# Patient Record
Sex: FEMALE | Race: WHITE | NOT HISPANIC OR LATINO | URBAN - METROPOLITAN AREA
[De-identification: names, ages, dates, MRNs, and addresses within clinical notes are randomized per-mention and may not be internally consistent; named-entity substitution may affect disease eponyms.]

---

## 2024-01-22 ENCOUNTER — LAB (OUTPATIENT)
Dept: LAB | Facility: HOSPITAL | Age: 34
End: 2024-01-22
Payer: COMMERCIAL

## 2024-01-22 ENCOUNTER — TRANSCRIBE ORDERS (OUTPATIENT)
Dept: LAB | Facility: HOSPITAL | Age: 34
End: 2024-01-22
Payer: COMMERCIAL

## 2024-01-22 DIAGNOSIS — Z3A.12 12 WEEKS GESTATION OF PREGNANCY: Primary | ICD-10-CM

## 2024-01-22 DIAGNOSIS — Z3A.12 12 WEEKS GESTATION OF PREGNANCY: ICD-10-CM

## 2024-01-22 LAB
ABO GROUP BLD: NORMAL
AMPHET+METHAMPHET UR QL: NEGATIVE
AMPHETAMINES UR QL: NEGATIVE
BACTERIA UR QL AUTO: ABNORMAL /HPF
BARBITURATES UR QL SCN: NEGATIVE
BENZODIAZ UR QL SCN: NEGATIVE
BILIRUB UR QL STRIP: NEGATIVE
BLD GP AB SCN SERPL QL: NEGATIVE
BUPRENORPHINE SERPL-MCNC: NEGATIVE NG/ML
CANNABINOIDS SERPL QL: NEGATIVE
CLARITY UR: ABNORMAL
COCAINE UR QL: NEGATIVE
COLOR UR: YELLOW
DEPRECATED RDW RBC AUTO: 40.9 FL (ref 37–54)
ERYTHROCYTE [DISTWIDTH] IN BLOOD BY AUTOMATED COUNT: 12.5 % (ref 12.3–15.4)
FENTANYL UR-MCNC: NEGATIVE NG/ML
GLUCOSE UR STRIP-MCNC: NEGATIVE MG/DL
HBV SURFACE AG SERPL QL IA: NORMAL
HCT VFR BLD AUTO: 38.6 % (ref 34–46.6)
HCV AB SER DONR QL: NORMAL
HGB BLD-MCNC: 12.6 G/DL (ref 12–15.9)
HGB UR QL STRIP.AUTO: NEGATIVE
HIV 1+2 AB+HIV1 P24 AG SERPL QL IA: NORMAL
HOLD SPECIMEN: NORMAL
HYALINE CASTS UR QL AUTO: ABNORMAL /LPF
KETONES UR QL STRIP: NEGATIVE
LEUKOCYTE ESTERASE UR QL STRIP.AUTO: ABNORMAL
MCH RBC QN AUTO: 29.4 PG (ref 26.6–33)
MCHC RBC AUTO-ENTMCNC: 32.6 G/DL (ref 31.5–35.7)
MCV RBC AUTO: 90 FL (ref 79–97)
METHADONE UR QL SCN: NEGATIVE
NITRITE UR QL STRIP: NEGATIVE
OPIATES UR QL: NEGATIVE
OXYCODONE UR QL SCN: NEGATIVE
PCP UR QL SCN: NEGATIVE
PH UR STRIP.AUTO: 8 [PH] (ref 5–8)
PLATELET # BLD AUTO: 314 10*3/MM3 (ref 140–450)
PMV BLD AUTO: 10.8 FL (ref 6–12)
PROT UR QL STRIP: ABNORMAL
RBC # BLD AUTO: 4.29 10*6/MM3 (ref 3.77–5.28)
RBC # UR STRIP: ABNORMAL /HPF
REF LAB TEST METHOD: ABNORMAL
RH BLD: POSITIVE
SP GR UR STRIP: 1.02 (ref 1–1.03)
SQUAMOUS #/AREA URNS HPF: ABNORMAL /HPF
TRICYCLICS UR QL SCN: NEGATIVE
UROBILINOGEN UR QL STRIP: ABNORMAL
WBC # UR STRIP: ABNORMAL /HPF
WBC NRBC COR # BLD AUTO: 7.77 10*3/MM3 (ref 3.4–10.8)

## 2024-01-22 PROCEDURE — 86850 RBC ANTIBODY SCREEN: CPT

## 2024-01-22 PROCEDURE — 86900 BLOOD TYPING SEROLOGIC ABO: CPT

## 2024-01-22 PROCEDURE — 86762 RUBELLA ANTIBODY: CPT

## 2024-01-22 PROCEDURE — 85027 COMPLETE CBC AUTOMATED: CPT

## 2024-01-22 PROCEDURE — 86803 HEPATITIS C AB TEST: CPT

## 2024-01-22 PROCEDURE — G0432 EIA HIV-1/HIV-2 SCREEN: HCPCS

## 2024-01-22 PROCEDURE — 87086 URINE CULTURE/COLONY COUNT: CPT

## 2024-01-22 PROCEDURE — 86780 TREPONEMA PALLIDUM: CPT

## 2024-01-22 PROCEDURE — 86901 BLOOD TYPING SEROLOGIC RH(D): CPT

## 2024-01-22 PROCEDURE — 81001 URINALYSIS AUTO W/SCOPE: CPT

## 2024-01-22 PROCEDURE — 87491 CHLMYD TRACH DNA AMP PROBE: CPT

## 2024-01-22 PROCEDURE — 80307 DRUG TEST PRSMV CHEM ANLYZR: CPT

## 2024-01-22 PROCEDURE — 87591 N.GONORRHOEAE DNA AMP PROB: CPT

## 2024-01-22 PROCEDURE — 87340 HEPATITIS B SURFACE AG IA: CPT

## 2024-01-22 PROCEDURE — 36415 COLL VENOUS BLD VENIPUNCTURE: CPT

## 2024-01-23 LAB
RUBV IGG SERPL IA-ACNC: 1.53 INDEX
TREPONEMA PALLIDUM IGG+IGM AB [PRESENCE] IN SERUM OR PLASMA BY IMMUNOASSAY: NON REACTIVE

## 2024-01-24 LAB
C TRACH RRNA SPEC QL NAA+PROBE: NEGATIVE
N GONORRHOEA RRNA SPEC QL NAA+PROBE: NEGATIVE

## 2024-01-29 LAB
BACTERIA SPEC AEROBE CULT: NORMAL
BACTERIA SPEC AEROBE CULT: NORMAL

## 2024-04-12 ENCOUNTER — LAB (OUTPATIENT)
Dept: LAB | Facility: HOSPITAL | Age: 34
End: 2024-04-12
Payer: COMMERCIAL

## 2024-04-12 ENCOUNTER — TRANSCRIBE ORDERS (OUTPATIENT)
Dept: LAB | Facility: HOSPITAL | Age: 34
End: 2024-04-12
Payer: COMMERCIAL

## 2024-04-12 DIAGNOSIS — Z3A.20 20 WEEKS GESTATION OF PREGNANCY: Primary | ICD-10-CM

## 2024-04-12 DIAGNOSIS — Z3A.20 20 WEEKS GESTATION OF PREGNANCY: ICD-10-CM

## 2024-04-12 LAB — GLUCOSE 1H P 100 G GLC PO SERPL-MCNC: 123 MG/DL (ref 65–139)

## 2024-04-12 PROCEDURE — 82950 GLUCOSE TEST: CPT

## 2024-04-12 PROCEDURE — 36415 COLL VENOUS BLD VENIPUNCTURE: CPT

## 2024-04-12 PROCEDURE — 82948 REAGENT STRIP/BLOOD GLUCOSE: CPT | Performed by: OBSTETRICS & GYNECOLOGY

## 2024-06-07 ENCOUNTER — TRANSCRIBE ORDERS (OUTPATIENT)
Dept: LAB | Facility: HOSPITAL | Age: 34
End: 2024-06-07
Payer: COMMERCIAL

## 2024-06-07 ENCOUNTER — LAB (OUTPATIENT)
Dept: LAB | Facility: HOSPITAL | Age: 34
End: 2024-06-07
Payer: COMMERCIAL

## 2024-06-07 DIAGNOSIS — Z34.83 PRENATAL CARE, SUBSEQUENT PREGNANCY, THIRD TRIMESTER: ICD-10-CM

## 2024-06-07 DIAGNOSIS — Z34.83 PRENATAL CARE, SUBSEQUENT PREGNANCY, THIRD TRIMESTER: Primary | ICD-10-CM

## 2024-06-07 LAB
DEPRECATED RDW RBC AUTO: 44.3 FL (ref 37–54)
ERYTHROCYTE [DISTWIDTH] IN BLOOD BY AUTOMATED COUNT: 13.1 % (ref 12.3–15.4)
HCT VFR BLD AUTO: 30.4 % (ref 34–46.6)
HGB BLD-MCNC: 10.2 G/DL (ref 12–15.9)
MCH RBC QN AUTO: 31.2 PG (ref 26.6–33)
MCHC RBC AUTO-ENTMCNC: 33.6 G/DL (ref 31.5–35.7)
MCV RBC AUTO: 93 FL (ref 79–97)
PLATELET # BLD AUTO: 277 10*3/MM3 (ref 140–450)
PMV BLD AUTO: 11.2 FL (ref 6–12)
RBC # BLD AUTO: 3.27 10*6/MM3 (ref 3.77–5.28)
WBC NRBC COR # BLD AUTO: 10.1 10*3/MM3 (ref 3.4–10.8)

## 2024-06-07 PROCEDURE — 85027 COMPLETE CBC AUTOMATED: CPT

## 2024-06-07 PROCEDURE — 36415 COLL VENOUS BLD VENIPUNCTURE: CPT

## 2024-07-19 ENCOUNTER — TRANSCRIBE ORDERS (OUTPATIENT)
Dept: LAB | Facility: HOSPITAL | Age: 34
End: 2024-07-19
Payer: COMMERCIAL

## 2024-07-19 ENCOUNTER — LAB (OUTPATIENT)
Dept: LAB | Facility: HOSPITAL | Age: 34
End: 2024-07-19
Payer: COMMERCIAL

## 2024-07-19 DIAGNOSIS — Z34.83 PRENATAL CARE, SUBSEQUENT PREGNANCY, THIRD TRIMESTER: Primary | ICD-10-CM

## 2024-07-19 DIAGNOSIS — Z34.83 PRENATAL CARE, SUBSEQUENT PREGNANCY, THIRD TRIMESTER: ICD-10-CM

## 2024-07-19 LAB
ALBUMIN SERPL-MCNC: 3.6 G/DL (ref 3.5–5.2)
ALBUMIN/GLOB SERPL: 1.1 G/DL
ALP SERPL-CCNC: 300 U/L (ref 39–117)
ALT SERPL W P-5'-P-CCNC: 149 U/L (ref 1–33)
ANION GAP SERPL CALCULATED.3IONS-SCNC: 12 MMOL/L (ref 5–15)
AST SERPL-CCNC: 125 U/L (ref 1–32)
BILE AC SERPL-SCNC: 15 UMOL/L (ref 0–10)
BILIRUB SERPL-MCNC: 0.4 MG/DL (ref 0–1.2)
BUN SERPL-MCNC: 7 MG/DL (ref 6–20)
BUN/CREAT SERPL: 10.9 (ref 7–25)
CALCIUM SPEC-SCNC: 9.5 MG/DL (ref 8.6–10.5)
CHLORIDE SERPL-SCNC: 101 MMOL/L (ref 98–107)
CO2 SERPL-SCNC: 23 MMOL/L (ref 22–29)
CREAT SERPL-MCNC: 0.64 MG/DL (ref 0.57–1)
EGFRCR SERPLBLD CKD-EPI 2021: 121.3 ML/MIN/1.73
GLOBULIN UR ELPH-MCNC: 3.3 GM/DL
GLUCOSE SERPL-MCNC: 91 MG/DL (ref 65–99)
POTASSIUM SERPL-SCNC: 4.7 MMOL/L (ref 3.5–5.2)
PROT SERPL-MCNC: 6.9 G/DL (ref 6–8.5)
SODIUM SERPL-SCNC: 136 MMOL/L (ref 136–145)

## 2024-07-19 PROCEDURE — 82239 BILE ACIDS TOTAL: CPT

## 2024-07-19 PROCEDURE — 80053 COMPREHEN METABOLIC PANEL: CPT

## 2024-07-19 PROCEDURE — 36415 COLL VENOUS BLD VENIPUNCTURE: CPT

## 2024-07-23 ENCOUNTER — HOSPITAL ENCOUNTER (OUTPATIENT)
Dept: LABOR AND DELIVERY | Facility: HOSPITAL | Age: 34
Discharge: HOME | End: 2024-07-23
Payer: COMMERCIAL

## 2024-07-23 ENCOUNTER — HOSPITAL ENCOUNTER (INPATIENT)
Facility: HOSPITAL | Age: 34
LOS: 2 days | Discharge: HOME | End: 2024-07-25
Attending: OBSTETRICS & GYNECOLOGY | Admitting: OBSTETRICS & GYNECOLOGY
Payer: COMMERCIAL

## 2024-07-23 PROBLEM — O26.643 CHOLESTASIS OF PREGNANCY IN THIRD TRIMESTER: Status: ACTIVE | Noted: 2024-07-23

## 2024-07-23 PROBLEM — O99.019 MATERNAL ANEMIA IN PREGNANCY, ANTEPARTUM: Status: ACTIVE | Noted: 2024-07-23

## 2024-07-23 PROBLEM — Z34.90 ENCOUNTER FOR INDUCTION OF LABOR: Status: ACTIVE | Noted: 2024-07-23

## 2024-07-23 LAB
ABO GROUP BLD: NORMAL
BLD GP AB SCN SERPL QL: NEGATIVE
DEPRECATED RDW RBC AUTO: 42.1 FL (ref 37–54)
ERYTHROCYTE [DISTWIDTH] IN BLOOD BY AUTOMATED COUNT: 12.7 % (ref 12.3–15.4)
EXTERNAL GROUP B STREP ANTIGEN: POSITIVE
HCT VFR BLD AUTO: 33.4 % (ref 34–46.6)
HGB BLD-MCNC: 11.4 G/DL (ref 12–15.9)
MCH RBC QN AUTO: 31.1 PG (ref 26.6–33)
MCHC RBC AUTO-ENTMCNC: 34.1 G/DL (ref 31.5–35.7)
MCV RBC AUTO: 91.3 FL (ref 79–97)
PLATELET # BLD AUTO: 265 10*3/MM3 (ref 140–450)
PMV BLD AUTO: 11.9 FL (ref 6–12)
RBC # BLD AUTO: 3.66 10*6/MM3 (ref 3.77–5.28)
RH BLD: POSITIVE
T&S EXPIRATION DATE: NORMAL
WBC NRBC COR # BLD AUTO: 9.85 10*3/MM3 (ref 3.4–10.8)

## 2024-07-23 PROCEDURE — 3E033VJ INTRODUCTION OF OTHER HORMONE INTO PERIPHERAL VEIN, PERCUTANEOUS APPROACH: ICD-10-PCS | Performed by: STUDENT IN AN ORGANIZED HEALTH CARE EDUCATION/TRAINING PROGRAM

## 2024-07-23 PROCEDURE — 86900 BLOOD TYPING SEROLOGIC ABO: CPT | Performed by: ADVANCED PRACTICE MIDWIFE

## 2024-07-23 PROCEDURE — 86850 RBC ANTIBODY SCREEN: CPT | Performed by: ADVANCED PRACTICE MIDWIFE

## 2024-07-23 PROCEDURE — 25010000002 PENICILLIN G POTASSIUM PER 600000 UNITS: Performed by: ADVANCED PRACTICE MIDWIFE

## 2024-07-23 PROCEDURE — 86780 TREPONEMA PALLIDUM: CPT | Performed by: ADVANCED PRACTICE MIDWIFE

## 2024-07-23 PROCEDURE — 25010000002 OXYTOCIN PER 10 UNITS: Performed by: ADVANCED PRACTICE MIDWIFE

## 2024-07-23 PROCEDURE — 86901 BLOOD TYPING SEROLOGIC RH(D): CPT | Performed by: ADVANCED PRACTICE MIDWIFE

## 2024-07-23 PROCEDURE — 59025 FETAL NON-STRESS TEST: CPT

## 2024-07-23 PROCEDURE — 25810000003 LACTATED RINGERS PER 1000 ML: Performed by: ADVANCED PRACTICE MIDWIFE

## 2024-07-23 PROCEDURE — 85027 COMPLETE CBC AUTOMATED: CPT | Performed by: ADVANCED PRACTICE MIDWIFE

## 2024-07-23 PROCEDURE — 36415 COLL VENOUS BLD VENIPUNCTURE: CPT | Performed by: ADVANCED PRACTICE MIDWIFE

## 2024-07-23 PROCEDURE — 25810000003 SODIUM CHLORIDE 0.9 % SOLUTION: Performed by: ADVANCED PRACTICE MIDWIFE

## 2024-07-23 RX ORDER — SODIUM CHLORIDE, SODIUM LACTATE, POTASSIUM CHLORIDE, CALCIUM CHLORIDE 600; 310; 30; 20 MG/100ML; MG/100ML; MG/100ML; MG/100ML
125 INJECTION, SOLUTION INTRAVENOUS CONTINUOUS
Status: DISCONTINUED | OUTPATIENT
Start: 2024-07-23 | End: 2024-07-25 | Stop reason: HOSPADM

## 2024-07-23 RX ORDER — SODIUM CHLORIDE 0.9 % (FLUSH) 0.9 %
10 SYRINGE (ML) INJECTION AS NEEDED
Status: DISCONTINUED | OUTPATIENT
Start: 2024-07-23 | End: 2024-07-24 | Stop reason: HOSPADM

## 2024-07-23 RX ORDER — OXYTOCIN/0.9 % SODIUM CHLORIDE 30/500 ML
2 PLASTIC BAG, INJECTION (ML) INTRAVENOUS
Status: DISCONTINUED | OUTPATIENT
Start: 2024-07-23 | End: 2024-07-25 | Stop reason: HOSPADM

## 2024-07-23 RX ORDER — PENICILLIN G 3000000 [IU]/50ML
3 INJECTION, SOLUTION INTRAVENOUS EVERY 4 HOURS
Qty: 200 ML | Refills: 0 | Status: DISCONTINUED | OUTPATIENT
Start: 2024-07-24 | End: 2024-07-24 | Stop reason: HOSPADM

## 2024-07-23 RX ORDER — SODIUM CHLORIDE 9 MG/ML
40 INJECTION, SOLUTION INTRAVENOUS AS NEEDED
Status: DISCONTINUED | OUTPATIENT
Start: 2024-07-23 | End: 2024-07-24 | Stop reason: HOSPADM

## 2024-07-23 RX ORDER — ONDANSETRON 4 MG/1
4 TABLET, ORALLY DISINTEGRATING ORAL EVERY 6 HOURS PRN
Status: DISCONTINUED | OUTPATIENT
Start: 2024-07-23 | End: 2024-07-24

## 2024-07-23 RX ORDER — LIDOCAINE HYDROCHLORIDE 10 MG/ML
0.5 INJECTION, SOLUTION EPIDURAL; INFILTRATION; INTRACAUDAL; PERINEURAL ONCE AS NEEDED
Status: DISCONTINUED | OUTPATIENT
Start: 2024-07-23 | End: 2024-07-24 | Stop reason: HOSPADM

## 2024-07-23 RX ORDER — MAGNESIUM CARB/ALUMINUM HYDROX 105-160MG
30 TABLET,CHEWABLE ORAL ONCE
Status: COMPLETED | OUTPATIENT
Start: 2024-07-23 | End: 2024-07-23

## 2024-07-23 RX ORDER — MORPHINE SULFATE 2 MG/ML
2 INJECTION, SOLUTION INTRAMUSCULAR; INTRAVENOUS EVERY 4 HOURS PRN
Status: DISCONTINUED | OUTPATIENT
Start: 2024-07-23 | End: 2024-07-24 | Stop reason: HOSPADM

## 2024-07-23 RX ORDER — ONDANSETRON 2 MG/ML
4 INJECTION INTRAMUSCULAR; INTRAVENOUS EVERY 6 HOURS PRN
Status: DISCONTINUED | OUTPATIENT
Start: 2024-07-23 | End: 2024-07-24

## 2024-07-23 RX ORDER — ACETAMINOPHEN 325 MG/1
650 TABLET ORAL EVERY 4 HOURS PRN
Status: DISCONTINUED | OUTPATIENT
Start: 2024-07-23 | End: 2024-07-24 | Stop reason: SDUPTHER

## 2024-07-23 RX ORDER — NALOXONE HCL 0.4 MG/ML
0.4 VIAL (ML) INJECTION
Status: DISCONTINUED | OUTPATIENT
Start: 2024-07-23 | End: 2024-07-24 | Stop reason: HOSPADM

## 2024-07-23 RX ORDER — MORPHINE SULFATE 2 MG/ML
2 INJECTION, SOLUTION INTRAMUSCULAR; INTRAVENOUS
Status: DISCONTINUED | OUTPATIENT
Start: 2024-07-23 | End: 2024-07-24 | Stop reason: HOSPADM

## 2024-07-23 RX ORDER — PRENATAL WITH FERROUS FUM AND FOLIC ACID 3080; 920; 120; 400; 22; 1.84; 3; 20; 10; 1; 12; 200; 27; 25; 2 [IU]/1; [IU]/1; MG/1; [IU]/1; MG/1; MG/1; MG/1; MG/1; MG/1; MG/1; UG/1; MG/1; MG/1; MG/1; MG/1
TABLET ORAL DAILY
COMMUNITY

## 2024-07-23 RX ORDER — TERBUTALINE SULFATE 1 MG/ML
0.25 INJECTION, SOLUTION SUBCUTANEOUS AS NEEDED
Status: DISCONTINUED | OUTPATIENT
Start: 2024-07-23 | End: 2024-07-24 | Stop reason: HOSPADM

## 2024-07-23 RX ORDER — SODIUM CHLORIDE 0.9 % (FLUSH) 0.9 %
10 SYRINGE (ML) INJECTION EVERY 12 HOURS SCHEDULED
Status: DISCONTINUED | OUTPATIENT
Start: 2024-07-23 | End: 2024-07-24 | Stop reason: HOSPADM

## 2024-07-23 RX ADMIN — MINERAL OIL 30 ML: 1000 SOLUTION ORAL at 20:31

## 2024-07-23 RX ADMIN — OXYTOCIN 2 MILLI-UNITS/MIN: 10 INJECTION INTRAVENOUS at 21:02

## 2024-07-23 RX ADMIN — SODIUM CHLORIDE 5 MILLION UNITS: 900 INJECTION INTRAVENOUS at 20:31

## 2024-07-23 RX ADMIN — SODIUM CHLORIDE, POTASSIUM CHLORIDE, SODIUM LACTATE AND CALCIUM CHLORIDE 125 ML/HR: 600; 310; 30; 20 INJECTION, SOLUTION INTRAVENOUS at 20:34

## 2024-07-23 NOTE — H&P
[facility]  Obstetric History and Physical    Chief Complaint   Patient presents with    Scheduled Induction       Subjective     Patient is a 33 y.o. female  currently at Unknown, who presents for induction of labor  for cholestasis  with favorable cervix. On intake reports contractions, denies  leakage of fluid, denies  vaginal bleeding. reports fetal movement.    Her prenatal care is complicated by  GBS + and cholestasis. Her previous obstetric/gynecological history is noted for is remarkable for  x 2 without anesthesia    The following portions of the patients history were reviewed and updated as appropriate: past medical history, past surgical history, past family history, and past social history .       Prenatal Information:  Prenatal Results    The patient does not have a working SUYAPA. Some results will not display without a working SUYAPA.   Initial Prenatal Labs       Test Value Reference Range Date Time    Hemoglobin        Hematocrit        Platelets        Rubella IgG        Hepatitis B SAg        Hepatitis C Ab        RPR        T. Pallidum Ab         ABO  A   24 1034    Rh  Positive   24 1034    Antibody Screen        HIV        Urine Culture        Gonorrhea        Chlamydia        TSH        HgB A1c         Varicella IgG        Hemoglobinopathy Fractionation        Hemoglobinopathy (genetic testing)        Cystic fibrosis                   Fetal testing        Test Value Reference Range Date Time    NIPT        MSAFP        AFP-4                  2nd and 3rd Trimester       Test Value Reference Range Date Time    Hemoglobin (repeated)        Hematocrit (repeated)        Platelets         1 hour GTT         Antibody Screen (repeated)        3rd TM syphilis scrn (repeated)  RPR         3rd TM syphilis scrn (repeated) TP-Ab        3rd TM syphilis screen TB-Ab (FTA)        Syphilis cascade test TP-Ab (EIA)        Syphilis cascade TPPA        GTT Fasting        GTT 1 Hr        GTT 2  Hr        GTT 3 Hr        Group B Strep                  Other testing        Test Value Reference Range Date Time    Parvo IgG         CMV IgG                   Drug Screening       Test Value Reference Range Date Time    Amphetamine Screen        Barbiturate Screen        Benzodiazepine Screen        Methadone Screen        Phencyclidine Screen        Opiates Screen        THC Screen        Cocaine Screen        Propoxyphene Screen        Buprenorphine Screen        Methamphetamine Screen        Oxycodone Screen        Tricyclic Antidepressants Screen                  Legend    ^: Historical                               Past OB History:       OB History    Para Term  AB Living   3 2 2 0 0 2   SAB IAB Ectopic Molar Multiple Live Births   0 0 0 0 0 0      # Outcome Date GA Lbr Antony/2nd Weight Sex Type Anes PTL Lv   3 Current            2 Term            1 Term                Past Medical History: No past medical history on file.   Past Surgical History Past Surgical History:   Procedure Laterality Date    TONSILLECTOMY        Family History: No family history on file.   Social History:  reports that she has never smoked. She has never used smokeless tobacco.   reports no history of alcohol use.   has no history on file for drug use.        REVIEW OF SYSTEMS              Reports fetal movement is normal             Denies leakage of amniotic fluid.             Denies vaginal bleeding             She reports No contractions, Occasional contractions, intensity mild             All other systems reviewed and are negative    Objective       Vital Signs Range for the last 24 hours  Temperature:     Temp Source:     BP: BP: (121)/(78) 121/78   Pulse: Heart Rate:  [82] 82   Respirations:     SPO2:     O2 Amount (l/min):     O2 Devices     Weight:         Presentation: vertex   Cervix: Exam by: Method: sterile exam per CNM   Dilation: Cervical Dilation (cm): 2   Effacement: Cervical Effacement: 60%   Station:  "Fetal Station: -2        Fetal Heart Rate Assessment   Method: Fetal HR Assessment Method: external   Beats/min: Fetal HR (beats/min): 125   Baseline: Fetal HR Baseline: normal range   Varibility: Fetal HR Variability: moderate (amplitude range 6 to 25 bpm)   Accels: Fetal HR Accelerations: greater than/equal to 15 bpm, lasting at least 15 seconds   Decels: Fetal HR Decelerations: absent   Tracing Category:       Uterine Assessment   Method: Method: external tocotransducer   Frequency (min): Contraction Frequency (Minutes): x2 ctx   Ctx Count in 10 min: Contractions in 10 Minutes: '   Duration:     Intensity:     Intensity by IUPC:     Resting Tone: Uterine Resting Tone: soft by palpation   Resting Tone by IUPC:     King Units:               Constitutional:  Well developed, well nourished, no acute distress, well-groomed.   Respiratory:  Lungs are clear to auscultation bilaterally, normal breath sounds.   Cardiovascular:  Normal rate and rhythm, no murmurs.   Gastrointestinal:  Soft, gravid, nontender.  Uterus: Soft, nontender. Fundus appropriate for dates.  Neurologic:  Alert & oriented x 3,  no focal deficits noted.   Psychiatric:  Speech and behavior appropriate.   Extremities: no cyanosis, clubbing or edema, no evidence of DVT.        Labs:  Lab Results   Component Value Date    WBC 9.85 07/23/2024    HGB 11.4 (L) 07/23/2024    HCT 33.4 (L) 07/23/2024    MCV 91.3 07/23/2024     07/23/2024    CREATININE 0.64 07/19/2024     (H) 07/19/2024     (H) 07/19/2024               Assessment & Plan       Encounter for induction of labor  Admitted for IOL but patient having some contractions on admission.  Given options for IOL and has declined steen balloon.  Is willing to do some Pitocin until contractions start then would like the Pitocin stopped and have her bag of water broken as \"this helped her last time\".      Assessment:   IUP at Unknown weeks gestation with reactive fetal status.    2.   " induction of labor  for cholestasis  with favorable cervix  3.   Obstetrical history significant for is remarkable for  x 2 without anesthesia  4.   GBS status: positive  5.   Rh: positive    Plan:  Expectant management, Oxytocin induction, and Antibiotics for GBS prophylaxis  Continuous FHT and TOCO monitoring.   Diet: NPO  Desires to go nautral  Plan of care has been reviewed with patient and questions answered.  Risks, benefits of treatment plan have been discussed.   Consent obtained to proceed with labor management and subsequent delivery of baby.        Yahaira Kerns CNM  2024  19:17 EDT

## 2024-07-24 PROBLEM — Z34.90 ENCOUNTER FOR INDUCTION OF LABOR: Status: ACTIVE | Noted: 2024-07-24

## 2024-07-24 PROBLEM — Z34.90 ENCOUNTER FOR INDUCTION OF LABOR: Status: RESOLVED | Noted: 2024-07-23 | Resolved: 2024-07-24

## 2024-07-24 LAB — TREPONEMA PALLIDUM IGG+IGM AB [PRESENCE] IN SERUM OR PLASMA BY IMMUNOASSAY: NORMAL

## 2024-07-24 PROCEDURE — 10907ZC DRAINAGE OF AMNIOTIC FLUID, THERAPEUTIC FROM PRODUCTS OF CONCEPTION, VIA NATURAL OR ARTIFICIAL OPENING: ICD-10-PCS | Performed by: STUDENT IN AN ORGANIZED HEALTH CARE EDUCATION/TRAINING PROGRAM

## 2024-07-24 PROCEDURE — 59025 FETAL NON-STRESS TEST: CPT

## 2024-07-24 PROCEDURE — 25010000002 OXYTOCIN PER 10 UNITS: Performed by: ADVANCED PRACTICE MIDWIFE

## 2024-07-24 PROCEDURE — 25010000002 PENICILLIN G POTASSIUM PER 600000 UNITS: Performed by: ADVANCED PRACTICE MIDWIFE

## 2024-07-24 PROCEDURE — 25810000003 SODIUM CHLORIDE 0.9 % SOLUTION: Performed by: ADVANCED PRACTICE MIDWIFE

## 2024-07-24 PROCEDURE — 25810000003 LACTATED RINGERS PER 1000 ML: Performed by: ADVANCED PRACTICE MIDWIFE

## 2024-07-24 RX ORDER — ONDANSETRON 2 MG/ML
4 INJECTION INTRAMUSCULAR; INTRAVENOUS EVERY 6 HOURS PRN
Status: DISCONTINUED | OUTPATIENT
Start: 2024-07-24 | End: 2024-07-25 | Stop reason: HOSPADM

## 2024-07-24 RX ORDER — HYDROCODONE BITARTRATE AND ACETAMINOPHEN 10; 325 MG/1; MG/1
1 TABLET ORAL EVERY 4 HOURS PRN
Status: DISCONTINUED | OUTPATIENT
Start: 2024-07-24 | End: 2024-07-25 | Stop reason: HOSPADM

## 2024-07-24 RX ORDER — DOCUSATE SODIUM 100 MG/1
100 CAPSULE, LIQUID FILLED ORAL 2 TIMES DAILY
Status: DISCONTINUED | OUTPATIENT
Start: 2024-07-24 | End: 2024-07-25 | Stop reason: HOSPADM

## 2024-07-24 RX ORDER — OXYCODONE AND ACETAMINOPHEN 10; 325 MG/1; MG/1
2 TABLET ORAL EVERY 4 HOURS PRN
Status: DISCONTINUED | OUTPATIENT
Start: 2024-07-24 | End: 2024-07-24 | Stop reason: HOSPADM

## 2024-07-24 RX ORDER — IBUPROFEN 600 MG/1
600 TABLET ORAL EVERY 6 HOURS PRN
Status: DISCONTINUED | OUTPATIENT
Start: 2024-07-24 | End: 2024-07-25 | Stop reason: HOSPADM

## 2024-07-24 RX ORDER — SODIUM CHLORIDE 0.9 % (FLUSH) 0.9 %
1-10 SYRINGE (ML) INJECTION AS NEEDED
Status: DISCONTINUED | OUTPATIENT
Start: 2024-07-24 | End: 2024-07-25 | Stop reason: HOSPADM

## 2024-07-24 RX ORDER — IBUPROFEN 600 MG/1
600 TABLET ORAL EVERY 6 HOURS PRN
Status: DISCONTINUED | OUTPATIENT
Start: 2024-07-24 | End: 2024-07-24 | Stop reason: HOSPADM

## 2024-07-24 RX ORDER — OXYTOCIN/0.9 % SODIUM CHLORIDE 30/500 ML
125 PLASTIC BAG, INJECTION (ML) INTRAVENOUS ONCE AS NEEDED
Status: DISCONTINUED | OUTPATIENT
Start: 2024-07-24 | End: 2024-07-25 | Stop reason: HOSPADM

## 2024-07-24 RX ORDER — ACETAMINOPHEN 325 MG/1
650 TABLET ORAL EVERY 6 HOURS PRN
Status: DISCONTINUED | OUTPATIENT
Start: 2024-07-24 | End: 2024-07-25 | Stop reason: HOSPADM

## 2024-07-24 RX ORDER — METHYLERGONOVINE MALEATE 0.2 MG/ML
200 INJECTION INTRAVENOUS ONCE AS NEEDED
Status: DISCONTINUED | OUTPATIENT
Start: 2024-07-24 | End: 2024-07-24 | Stop reason: HOSPADM

## 2024-07-24 RX ORDER — OXYTOCIN/0.9 % SODIUM CHLORIDE 30/500 ML
999 PLASTIC BAG, INJECTION (ML) INTRAVENOUS ONCE
Status: COMPLETED | OUTPATIENT
Start: 2024-07-24 | End: 2024-07-24

## 2024-07-24 RX ORDER — ONDANSETRON 2 MG/ML
4 INJECTION INTRAMUSCULAR; INTRAVENOUS EVERY 6 HOURS PRN
Status: DISCONTINUED | OUTPATIENT
Start: 2024-07-24 | End: 2024-07-24 | Stop reason: HOSPADM

## 2024-07-24 RX ORDER — FERROUS SULFATE 325(65) MG
325 TABLET ORAL
Status: DISCONTINUED | OUTPATIENT
Start: 2024-07-24 | End: 2024-07-25 | Stop reason: HOSPADM

## 2024-07-24 RX ORDER — ONDANSETRON 4 MG/1
4 TABLET, ORALLY DISINTEGRATING ORAL EVERY 6 HOURS PRN
Status: DISCONTINUED | OUTPATIENT
Start: 2024-07-24 | End: 2024-07-24 | Stop reason: HOSPADM

## 2024-07-24 RX ORDER — BISACODYL 10 MG
10 SUPPOSITORY, RECTAL RECTAL DAILY PRN
Status: DISCONTINUED | OUTPATIENT
Start: 2024-07-25 | End: 2024-07-25 | Stop reason: HOSPADM

## 2024-07-24 RX ORDER — CARBOPROST TROMETHAMINE 250 UG/ML
250 INJECTION, SOLUTION INTRAMUSCULAR AS NEEDED
Status: DISCONTINUED | OUTPATIENT
Start: 2024-07-24 | End: 2024-07-24 | Stop reason: HOSPADM

## 2024-07-24 RX ORDER — HYDROCODONE BITARTRATE AND ACETAMINOPHEN 5; 325 MG/1; MG/1
1 TABLET ORAL EVERY 4 HOURS PRN
Status: DISCONTINUED | OUTPATIENT
Start: 2024-07-24 | End: 2024-07-25 | Stop reason: HOSPADM

## 2024-07-24 RX ORDER — OXYTOCIN/0.9 % SODIUM CHLORIDE 30/500 ML
250 PLASTIC BAG, INJECTION (ML) INTRAVENOUS CONTINUOUS
Status: ACTIVE | OUTPATIENT
Start: 2024-07-24 | End: 2024-07-24

## 2024-07-24 RX ORDER — ACETAMINOPHEN 325 MG/1
650 TABLET ORAL EVERY 4 HOURS PRN
Status: DISCONTINUED | OUTPATIENT
Start: 2024-07-24 | End: 2024-07-24 | Stop reason: HOSPADM

## 2024-07-24 RX ORDER — MISOPROSTOL 200 UG/1
800 TABLET ORAL AS NEEDED
Status: DISCONTINUED | OUTPATIENT
Start: 2024-07-24 | End: 2024-07-24 | Stop reason: HOSPADM

## 2024-07-24 RX ORDER — HYDROCORTISONE 25 MG/G
1 CREAM TOPICAL AS NEEDED
Status: DISCONTINUED | OUTPATIENT
Start: 2024-07-24 | End: 2024-07-25 | Stop reason: HOSPADM

## 2024-07-24 RX ADMIN — PENICILLIN G 3 MILLION UNITS: 3000000 INJECTION, SOLUTION INTRAVENOUS at 00:16

## 2024-07-24 RX ADMIN — DOCUSATE SODIUM 100 MG: 100 CAPSULE, LIQUID FILLED ORAL at 09:06

## 2024-07-24 RX ADMIN — PENICILLIN G 3 MILLION UNITS: 3000000 INJECTION, SOLUTION INTRAVENOUS at 03:34

## 2024-07-24 RX ADMIN — Medication: at 06:53

## 2024-07-24 RX ADMIN — OXYTOCIN 2 MILLI-UNITS/MIN: 10 INJECTION INTRAVENOUS at 02:56

## 2024-07-24 RX ADMIN — SODIUM CHLORIDE, POTASSIUM CHLORIDE, SODIUM LACTATE AND CALCIUM CHLORIDE 125 ML/HR: 600; 310; 30; 20 INJECTION, SOLUTION INTRAVENOUS at 00:22

## 2024-07-24 RX ADMIN — DOCUSATE SODIUM 100 MG: 100 CAPSULE, LIQUID FILLED ORAL at 19:35

## 2024-07-24 RX ADMIN — IBUPROFEN 600 MG: 600 TABLET, FILM COATED ORAL at 06:01

## 2024-07-24 RX ADMIN — FERROUS SULFATE TAB 325 MG (65 MG ELEMENTAL FE) 325 MG: 325 (65 FE) TAB at 09:06

## 2024-07-24 RX ADMIN — Medication 1 APPLICATION: at 06:53

## 2024-07-24 RX ADMIN — WITCH HAZEL: 500 SOLUTION RECTAL; TOPICAL at 06:53

## 2024-07-24 RX ADMIN — OXYTOCIN 999 ML/HR: 10 INJECTION INTRAVENOUS at 04:30

## 2024-07-24 NOTE — L&D DELIVERY NOTE
[facility]   Vaginal Delivery Note    Patient Name: Ashley Lucia  : 1990  MRN: [id number]    Date of Delivery: 2024     Diagnosis     Pre & Post-Delivery:  Intrauterine pregnancy at 38w5d  Labor status: Induced Onset of Labor     Cholestasis of pregnancy in third trimester    Maternal anemia in pregnancy, antepartum     (normal spontaneous vaginal delivery)             Problem List    Transfer to Postpartum     Review the Delivery Report for details.     Delivery     Delivery: Vaginal, Spontaneous     YOB: 2022    Time of Birth:  Gestational Age 4:24 AM   38w5d     Anesthesia: None     Delivering clinician: Yahaira Kerns    Forceps?   No   Vacuum? No    Shoulder dystocia present: No        Delivery narrative:  Progressed to 8-9 cm on her own after steen balloon and AROM.  She wsa then pushing uncontrollably with cervix all around head.  Contractions were about every 4 minutes.  Pitocin added at the last to help her get to complete  at 38w5d and then she pushed and rapidly delivered a viable female infant over an intact perineum.  Anterior shoulder delivered with ease. Infant vigorous at delivery so placed on maternal abdomen. Delayed cord clamping x 1 min. Cord doubly clamped and cut. Cord blood and cord segment obtained. Placenta delivered spontaneously with trailing membranes and appeared intact. Pitocin to IVF. =      Infant     Findings: female  infant     Infant observations: Weight: 3310 g (7 lb 4.8 oz)   Length: 19.75  in  Observations/Comments:        Apgars: 8  @ 1 minute /    9  @ 5 minutes   Infant Name: undecided     Placenta & Cord         Placenta delivered  Spontaneous  at   2024  4:31 AM     Cord: 3 vessels  present.   Nuchal Cord?  no   Cord blood obtained: Yes    Cord gases obtained:  No    Cord gas results: N/A         Repair     Episiotomy: None     No    Lacerations: No   Estimated Blood Loss:       Quantitative Blood Loss:  100         Complications     none    Disposition     Mother to Mother Baby/Postpartum  in stable condition currently.  Baby to remains with mom  in stable condition currently.    Yahaira Kerns CNM  07/24/24  04:45 EDT

## 2024-07-24 NOTE — PAYOR COMM NOTE
"Elizabeth Cumminsiya Loida (33 y.o. Female)     [employer] ID#[id number]     Delivery Information:  Vaginal Delivery 7/24/24 @ 04:24  Wt 3310 gms  Female  Apgars 8/9    From:Chiara De Souza LPN, Utilization Review  Phone #[phone]  Fax #[phone]        Date of Birth   1990    Social Security Number       Address   [address] [address]    Home Phone   [phone]    MRN   [id number]       Muslim   None    Marital Status                               Admission Date   7/23/24    Admission Type   Elective    Admitting Provider   Jaquelin Ghosh MD    Attending Provider   Jaquelin Ghosh MD    Department, Room/Bed   [facility] MOTHER BABY 4A, N402/1       Discharge Date       Discharge Disposition       Discharge Destination                                 Attending Provider: Jaquelin Ghosh MD    Allergies: No Known Allergies    Isolation: None   Infection: None   Code Status: CPR    Ht: 157.5 cm (62\")   Wt: 84.8 kg (187 lb)    Admission Cmt: None   Principal Problem: Encounter for induction of labor [Z34.90]                   Active Insurance as of 7/23/2024       Primary Coverage       Payor Plan Insurance Group Employer/Plan Group    [employer] [place] [employer]        Payor Plan Address Payor Plan Phone Number Payor Plan Fax Number Effective Dates    [address] [address] [phone]  7/22/2016 - None Entered    [facility] [id number]         Subscriber Name Subscriber Birth Date Member ID       YANDEL CUMMINS LOIDA 1990 [id number]                     Emergency Contacts        (Rel.) Home Phone Work Phone Mobile Phone    Flaco Cummins (Spouse) [phone] -- --              Insurance Information                  [facility]/[employer] MEDICAID Phone: [phone]    Subscriber: Yandel Cummins Subscriber#: [id number]    Group#: -- Precert#: --             History & Physical        Yahaira Kerns CNM at 07/23/24 1917  "      Attestation signed by Randi Humphreys DO at 24 5644    I personally reviewed patient information and assessment. Agree with plan of care and above documentation.     Randi Humphreys DO  24  21:04 EDT                  [facility]  Obstetric History and Physical    Chief Complaint   Patient presents with    Scheduled Induction       Subjective    Patient is a 33 y.o. female  currently at Unknown, who presents for induction of labor  for cholestasis  with favorable cervix. On intake reports contractions, denies  leakage of fluid, denies  vaginal bleeding. reports fetal movement.    Her prenatal care is complicated by  GBS + and cholestasis. Her previous obstetric/gynecological history is noted for is remarkable for  x 2 without anesthesia    The following portions of the patients history were reviewed and updated as appropriate: past medical history, past surgical history, past family history, and past social history .       Prenatal Information:  Prenatal Results    The patient does not have a working SUYPAA. Some results will not display without a working SUYAPA.   Initial Prenatal Labs       Test Value Reference Range Date Time    Hemoglobin        Hematocrit        Platelets        Rubella IgG        Hepatitis B SAg        Hepatitis C Ab        RPR        T. Pallidum Ab         ABO  A   24 1034    Rh  Positive   24 1034    Antibody Screen        HIV        Urine Culture        Gonorrhea        Chlamydia        TSH        HgB A1c         Varicella IgG        Hemoglobinopathy Fractionation        Hemoglobinopathy (genetic testing)        Cystic fibrosis                   Fetal testing        Test Value Reference Range Date Time    NIPT        MSAFP        AFP-4                  2nd and 3rd Trimester       Test Value Reference Range Date Time    Hemoglobin (repeated)        Hematocrit (repeated)        Platelets         1 hour GTT         Antibody Screen (repeated)        3rd TM  syphilis scrn (repeated)  RPR         3rd TM syphilis scrn (repeated) TP-Ab        3rd TM syphilis screen TB-Ab (FTA)        Syphilis cascade test TP-Ab (EIA)        Syphilis cascade TPPA        GTT Fasting        GTT 1 Hr        GTT 2 Hr        GTT 3 Hr        Group B Strep                  Other testing        Test Value Reference Range Date Time    Parvo IgG         CMV IgG                   Drug Screening       Test Value Reference Range Date Time    Amphetamine Screen        Barbiturate Screen        Benzodiazepine Screen        Methadone Screen        Phencyclidine Screen        Opiates Screen        THC Screen        Cocaine Screen        Propoxyphene Screen        Buprenorphine Screen        Methamphetamine Screen        Oxycodone Screen        Tricyclic Antidepressants Screen                  Legend    ^: Historical                               Past OB History:       OB History    Para Term  AB Living   3 2 2 0 0 2   SAB IAB Ectopic Molar Multiple Live Births   0 0 0 0 0 0      # Outcome Date GA Lbr Antony/2nd Weight Sex Type Anes PTL Lv   3 Current            2 Term            1 Term                Past Medical History: No past medical history on file.   Past Surgical History Past Surgical History:   Procedure Laterality Date    TONSILLECTOMY        Family History: No family history on file.   Social History:  reports that she has never smoked. She has never used smokeless tobacco.   reports no history of alcohol use.   has no history on file for drug use.        REVIEW OF SYSTEMS              Reports fetal movement is normal             Denies leakage of amniotic fluid.             Denies vaginal bleeding             She reports No contractions, Occasional contractions, intensity mild             All other systems reviewed and are negative    Objective      Vital Signs Range for the last 24 hours  Temperature:     Temp Source:     BP: BP: (121)/(78) 121/78   Pulse: Heart Rate:  [82] 82    Respirations:     SPO2:     O2 Amount (l/min):     O2 Devices     Weight:         Presentation: vertex   Cervix: Exam by: Method: sterile exam per CNM   Dilation: Cervical Dilation (cm): 2   Effacement: Cervical Effacement: 60%   Station: Fetal Station: -2        Fetal Heart Rate Assessment   Method: Fetal HR Assessment Method: external   Beats/min: Fetal HR (beats/min): 125   Baseline: Fetal HR Baseline: normal range   Varibility: Fetal HR Variability: moderate (amplitude range 6 to 25 bpm)   Accels: Fetal HR Accelerations: greater than/equal to 15 bpm, lasting at least 15 seconds   Decels: Fetal HR Decelerations: absent   Tracing Category:       Uterine Assessment   Method: Method: external tocotransducer   Frequency (min): Contraction Frequency (Minutes): x2 ctx   Ctx Count in 10 min: Contractions in 10 Minutes: '   Duration:     Intensity:     Intensity by IUPC:     Resting Tone: Uterine Resting Tone: soft by palpation   Resting Tone by IUPC:     Ririe Units:               Constitutional:  Well developed, well nourished, no acute distress, well-groomed.   Respiratory:  Lungs are clear to auscultation bilaterally, normal breath sounds.   Cardiovascular:  Normal rate and rhythm, no murmurs.   Gastrointestinal:  Soft, gravid, nontender.  Uterus: Soft, nontender. Fundus appropriate for dates.  Neurologic:  Alert & oriented x 3,  no focal deficits noted.   Psychiatric:  Speech and behavior appropriate.   Extremities: no cyanosis, clubbing or edema, no evidence of DVT.        Labs:  Lab Results   Component Value Date    WBC 9.85 07/23/2024    HGB 11.4 (L) 07/23/2024    HCT 33.4 (L) 07/23/2024    MCV 91.3 07/23/2024     07/23/2024    CREATININE 0.64 07/19/2024     (H) 07/19/2024     (H) 07/19/2024               Assessment & Plan      Encounter for induction of labor  Admitted for IOL but patient having some contractions on admission.  Given options for IOL and has declined steen balloon.  Is  "willing to do some Pitocin until contractions start then would like the Pitocin stopped and have her bag of water broken as \"this helped her last time\".      Assessment:   IUP at Unknown weeks gestation with reactive fetal status.    2.   induction of labor  for cholestasis  with favorable cervix  3.   Obstetrical history significant for is remarkable for  x 2 without anesthesia  4.   GBS status: positive  5.   Rh: positive    Plan:  Expectant management, Oxytocin induction, and Antibiotics for GBS prophylaxis  Continuous FHT and TOCO monitoring.   Diet: NPO  Desires to go nautral  Plan of care has been reviewed with patient and questions answered.  Risks, benefits of treatment plan have been discussed.   Consent obtained to proceed with labor management and subsequent delivery of baby.        Yahaira Kerns CNM  2024  19:17 EDT    Electronically signed by Randi Humphreys DO at 24 210       Facility-Administered Medications as of 2024   Medication Dose Route Frequency Provider Last Rate Last Admin    acetaminophen (TYLENOL) tablet 650 mg  650 mg Oral Q6H PRN Yahaira Kerns CNM        benzocaine (AMERICAINE) 20 % rectal ointment 1 Application  1 Application Rectal PRN Yahaira Kerns CNM        benzocaine-menthol (DERMOPLAST) 20-0.5 % topical spray   Topical PRN Yahaira Kerns CNM   Given at 24 0653    [START ON 2024] bisacodyl (DULCOLAX) suppository 10 mg  10 mg Rectal Daily PRN Yahaira Kerns CNM        docusate sodium (COLACE) capsule 100 mg  100 mg Oral BID Yahaira Kerns CNM        ferrous sulfate tablet 325 mg  325 mg Oral Daily With Breakfast Yahaira Kerns CNM        HYDROcodone-acetaminophen (NORCO) 5-325 MG per tablet 1 tablet  1 tablet Oral Q4H PRN Yahaira Kerns CNM        Or    HYDROcodone-acetaminophen (NORCO)  MG per tablet 1 tablet  1 tablet Oral Q4H PRN Yahaira Kerns, CAIO        Hydrocortisone (Perianal) (ANUSOL-HC) 2.5 % rectal cream 1 " Application  1 Application Rectal PRN Yahaira Kerns CNM        ibuprofen (ADVIL,MOTRIN) tablet 600 mg  600 mg Oral Q6H PRN Yahaira Kerns CNM        lactated ringers infusion  125 mL/hr Intravenous Continuous Yahaira Kerns  mL/hr at 24 0022 125 mL/hr at 24 0022    lanolin topical 1 Application  1 Application Topical Q1H PRN Yahaira Kerns CNM   1 Application at 24 0653    magnesium hydroxide (MILK OF MAGNESIA) 400 MG/5ML suspension 30 mL  30 mL Oral Daily PRN Yahaira Kerns CNM        [COMPLETED] mineral oil liquid 30 mL  30 mL Topical Once Yahaira Kerns CNM   30 mL at 24    ondansetron (ZOFRAN) injection 4 mg  4 mg Intravenous Q6H PRN Yahaira Kerns CNM        [COMPLETED] oxytocin (PITOCIN) 30 units in 0.9% sodium chloride 500 mL (premix)  999 mL/hr Intravenous Once Yahaira Kerns  mL/hr at 24 0430 999 mL/hr at 24 0430    Followed by    [] oxytocin (PITOCIN) 30 units in 0.9% sodium chloride 500 mL (premix)  250 mL/hr Intravenous Continuous Yahaira Kerns  mL/hr at 24 0500 250 mL/hr at 24 0500    oxytocin (PITOCIN) 30 units in 0.9% sodium chloride 500 mL (premix)  2 jimbo-units/min Intravenous Titrated Yahaira Kerns CNM 6 mL/hr at 24 0405 6 jimbo-units/min at 24 0405    oxytocin (PITOCIN) 30 units in 0.9% sodium chloride 500 mL (premix)  125 mL/hr Intravenous Once PRN Yahaira Kerns CNM        [COMPLETED] penicillin G potassium 5 Million Units in sodium chloride 0.9 % 100 mL MBP  5 Million Units Intravenous Once Yahaira Kerns CNM   5 Million Units at 24    sodium chloride 0.9 % flush 1-10 mL  1-10 mL Intravenous PRN Yahaira Kerns CNM        witch hazel-glycerin (TUCKS) pad   Topical PRN Yahaira Kerns CNM   Given at 24 0653     Orders (last 24 hrs)        Start     Ordered    24 0600  CBC & Differential  Timed        Comments: Postpartum Day 1       07/24/24 0642    07/25/24 0000  bisacodyl (DULCOLAX) suppository 10 mg  Daily PRN         07/24/24 0642    07/24/24 0900  docusate sodium (COLACE) capsule 100 mg  2 Times Daily         07/24/24 0642    07/24/24 0800  ferrous sulfate tablet 325 mg  Daily With Breakfast         07/24/24 0447    07/24/24 0643  Code Status and Medical Interventions: CPR (Attempt to Resuscitate); Full  Continuous         07/24/24 0642    07/24/24 0643  Vital Signs Per hospital policy  Per Hospital Policy         07/24/24 0642    07/24/24 0643  Notify Physician  Until Discontinued         07/24/24 0642    07/24/24 0643  Up Ad Ruthy  Until Discontinued         07/24/24 0642    07/24/24 0643  Ambulate Patient  Every Shift       07/24/24 0642    07/24/24 0643  Diet: Regular/House; Fluid Consistency: Thin (IDDSI 0)  Diet Effective Now         07/24/24 0642    07/24/24 0643  Advance Diet As Tolerated -Regular  Until Discontinued         07/24/24 0642    07/24/24 0643  Fundal and Lochia Check  Per Hospital Policy        Comments: Q 15 min x 4, Q 30 min x 2, then Q Shift    07/24/24 0642    07/24/24 0643  RN to Assess Rh Status & Place RhIG Evaluation Order if Indicated  Continuous         07/24/24 0642    07/24/24 0643  Bladder Assessment  Per Order Details        Comments: Postpartum 1) Upon Admission to Unit & Every 4 Hours PRN Until Voiding. 2) Out of Bed to Void in 8 Hours.    07/24/24 0642    07/24/24 0643  Straight Cath  Per Order Details        Comments: Postpartum: If Distended & Unable to Void, May Repeat Once.    07/24/24 0642    07/24/24 0643  Indwelling Urinary Catheter  Per Order Details        Comments: Postpartum : After Straight Cathed x2 or if Greater Than 1000mL Residual, Insert Indwelling Urinary Catheter Until Further MD Order.    07/24/24 0642    07/24/24 0643  Breast pump to bed  Once         07/24/24 0642    07/24/24 0642  acetaminophen (TYLENOL) tablet 650 mg  Every 6 Hours PRN         07/24/24 0642    07/24/24 0642   "HYDROcodone-acetaminophen (NORCO) 5-325 MG per tablet 1 tablet  Every 4 Hours PRN        Placed in \"Or\" Linked Group    07/24/24 0642    07/24/24 0642  HYDROcodone-acetaminophen (NORCO)  MG per tablet 1 tablet  Every 4 Hours PRN        Placed in \"Or\" Linked Group    07/24/24 0642    07/24/24 0642  magnesium hydroxide (MILK OF MAGNESIA) 400 MG/5ML suspension 30 mL  Daily PRN         07/24/24 0642    07/24/24 0642  lanolin topical 1 Application  Every 1 Hour PRN         07/24/24 0642    07/24/24 0642  benzocaine-menthol (DERMOPLAST) 20-0.5 % topical spray  As Needed         07/24/24 0642    07/24/24 0642  witch hazel-glycerin (TUCKS) pad  As Needed         07/24/24 0642    07/24/24 0642  Hydrocortisone (Perianal) (ANUSOL-HC) 2.5 % rectal cream 1 Application  As Needed         07/24/24 0642    07/24/24 0642  benzocaine (AMERICAINE) 20 % rectal ointment 1 Application  As Needed         07/24/24 0642    07/24/24 0642  ondansetron (ZOFRAN) injection 4 mg  Every 6 Hours PRN         07/24/24 0642    07/24/24 0642  sodium chloride 0.9 % flush 1-10 mL  As Needed         07/24/24 0642    07/24/24 0642  oxytocin (PITOCIN) 30 units in 0.9% sodium chloride 500 mL (premix)  Once As Needed         07/24/24 0642    07/24/24 0642  ibuprofen (ADVIL,MOTRIN) tablet 600 mg  Every 6 Hours PRN         07/24/24 0642    07/24/24 0545  oxytocin (PITOCIN) 30 units in 0.9% sodium chloride 500 mL (premix)  Continuous        Placed in \"Followed by\" Linked Group    07/24/24 0443    07/24/24 0530  oxytocin (PITOCIN) 30 units in 0.9% sodium chloride 500 mL (premix)  Once        Placed in \"Followed by\" Linked Group    07/24/24 0443    07/24/24 0448  Admit To Obstetrics Inpatient  Once         07/24/24 0448    07/24/24 0444  Notify Physician (specified)  Until Discontinued         07/24/24 0443    07/24/24 0444  Vital Signs Per hospital policy  Per Hospital Policy         07/24/24 0443    07/24/24 0444  Fundal & Lochia Check  Per Order Details   " "     Comments: Every 15 Minutes x4, Then Every 30 Minutes x2, Then Every Shift    07/24/24 0443    07/24/24 0444  Diet: Regular/House; Fluid Consistency: Thin (IDDSI 0)  Diet Effective Now,   Status:  Canceled         07/24/24 0443    07/24/24 0444  Blood Gas, Arterial, Cord  Once        Comments: If requested by delivery provider at time of delivery      07/24/24 0443    07/24/24 0444  Blood Gas, Venous, Cord  Once        Comments: If requested by delivery provider at time of delivery      07/24/24 0443    07/24/24 0443  Fundal & Lochia Check  Every Shift       07/24/24 0443    07/24/24 0443  acetaminophen (TYLENOL) tablet 650 mg  Every 4 Hours PRN,   Status:  Discontinued         07/24/24 0443 07/24/24 0443  ibuprofen (ADVIL,MOTRIN) tablet 600 mg  Every 6 Hours PRN,   Status:  Discontinued         07/24/24 0443 07/24/24 0443  oxyCODONE-acetaminophen (PERCOCET)  MG per tablet 2 tablet  Every 4 Hours PRN,   Status:  Discontinued         07/24/24 0443 07/24/24 0443  methylergonovine (METHERGINE) injection 200 mcg  Once As Needed,   Status:  Discontinued         07/24/24 0443 07/24/24 0443  carboprost (HEMABATE) injection 250 mcg  As Needed,   Status:  Discontinued         07/24/24 0443 07/24/24 0443  miSOPROStol (CYTOTEC) tablet 800 mcg  As Needed,   Status:  Discontinued         07/24/24 0443 07/24/24 0443  ondansetron ODT (ZOFRAN-ODT) disintegrating tablet 4 mg  Every 6 Hours PRN,   Status:  Discontinued        Placed in \"Or\" Linked Group    07/24/24 0443    07/24/24 0443  ondansetron (ZOFRAN) injection 4 mg  Every 6 Hours PRN,   Status:  Discontinued        Placed in \"Or\" Linked Group    07/24/24 0443    07/24/24 0440  VTE Prophylaxis Not Indicated: Low Risk; Obesity - BMI >30 (1)  Once         07/24/24 0440    07/24/24 0439  Transfer Patient  Once         07/24/24 0439    07/24/24 0000  penicillin G in iso-osmotic dextrose IVPB 3 million units (premix)  Every 4 Hours,   Status:  " "Discontinued        Placed in \"Followed by\" Linked Group    24  sodium chloride 0.9 % flush 10 mL  Every 12 Hours Scheduled,   Status:  Discontinued         24  lactated ringers bolus 1,000 mL  Once,   Status:  Discontinued         24  lactated ringers infusion  Continuous         24  mineral oil liquid 30 mL  Once         24  penicillin G potassium 5 Million Units in sodium chloride 0.9 % 100 mL MBP  Once        Placed in \"Followed by\" Linked Group    24  oxytocin (PITOCIN) 30 units in 0.9% sodium chloride 500 mL (premix)  Titrated         24  morphine injection 2 mg  Every 2 Hours PRN,   Status:  Discontinued         24  Initiate Group Beta Strep (GBS) Prophylaxis Protocol, If Criteria Met  Continuous        Comments: NO TREATMENT RECOMMENDED IF: 1)  Maternal GBS status known negative 2)  Scheduled  birth with intact membranes, not in labor.  3 ) Maternal GBS unknown, no risk factors.   TREAT WITH ANTIBIOTICS IF:  1)  Maternal GBS status is known postive.  2)  Maternal GBS status unknown with these risk factors:  a)  Previous infant affected by GBS infection.  b)  GBS urinary tract infection (UTI) or bacteruria during pregnancy  c)  Unexplained maternal fever in labor (greater than or equal to 100.4F or 38.0C)  d)  Prolonged rupture of the membranes greater than or equal to 18 hours.  e)  Gestational age less than 37 weeks.    24  Diet: Liquid; Clear Liquid; Fluid Consistency: Thin (IDDSI 0)  Diet Effective Now,   Status:  Canceled         24  Code Status and Medical Interventions: CPR (Attempt to Resuscitate); Full Support  Continuous,   Status:  Canceled         24  Obtain informed consent  Once      " "   07/23/24 1917 07/23/24 1910  Vital Signs Per hospital policy  Per Hospital Policy         07/23/24 1917 07/23/24 1910  Continuous Fetal Monitoring With NST on Admission and Prior to Initiation of Oxytocin.  Per Order Details        Comments: Continuous Fetal Monitoring With NST on Admission & Prior to Initiation of Oxytocin.    07/23/24 1917 07/23/24 1910  External Uterine Contraction Monitoring  Per Hospital Policy         07/23/24 1917 07/23/24 1910  Notify Physician (specified)  Until Discontinued         07/23/24 1917 07/23/24 1910  Notify physician for tachysystole (per hospital algorithm)  Until Discontinued         07/23/24 1917 07/23/24 1910  Notify physician if membranes ruptured, bleeding greater than 1 pad an hour, fetal heart tone abnormality, and severe pain  Until Discontinued         07/23/24 1917 07/23/24 1910  Up Ad Ruthy  Until Discontinued         07/23/24 1917 07/23/24 1910  Cervical Exam  Once        Comments: Unless contraindicated, every 1-2 hours in active labor, or at nurse's discretion.    07/23/24 1917 07/23/24 1910  Insert Peripheral IV  Once         07/23/24 1917 07/23/24 1910  Saline Lock & Maintain IV Access  Continuous         07/23/24 1917 07/23/24 1910  VTE Prophylaxis Not Indicated: Low Risk; No Risk Factors (0)  Once         07/23/24 1917 07/23/24 1909  morphine injection 2 mg  Every 4 Hours PRN,   Status:  Discontinued        Placed in \"And\" Linked Group    07/23/24 1917 07/23/24 1909  naloxone (NARCAN) injection 0.4 mg  Every 5 Minutes PRN,   Status:  Discontinued        Placed in \"And\" Linked Group    07/23/24 1917 07/23/24 1909  lidocaine PF 1% (XYLOCAINE) injection 0.5 mL  Once As Needed,   Status:  Discontinued         07/23/24 1917 07/23/24 1909  acetaminophen (TYLENOL) tablet 650 mg  Every 4 Hours PRN,   Status:  Discontinued         07/23/24 1917 07/23/24 1909  ondansetron ODT (ZOFRAN-ODT) disintegrating tablet 4 mg  Every " "6 Hours PRN,   Status:  Discontinued        Placed in \"Or\" Linked Group    07/23/24 1917 07/23/24 1909  ondansetron (ZOFRAN) injection 4 mg  Every 6 Hours PRN,   Status:  Discontinued        Placed in \"Or\" Linked Group    07/23/24 1917 07/23/24 1909  terbutaline (BRETHINE) injection 0.25 mg  As Needed,   Status:  Discontinued         07/23/24 1917 07/23/24 1909  sodium chloride 0.9 % flush 10 mL  As Needed,   Status:  Discontinued         07/23/24 1917 07/23/24 1909  sodium chloride 0.9 % infusion 40 mL  As Needed,   Status:  Discontinued         07/23/24 1917 07/23/24 1909  Admit To Obstetrics Inpatient  Once         07/23/24 1909 07/23/24 1831  CBC (No Diff)  STAT         07/23/24 1831    07/23/24 1831  Type & Screen  STAT         07/23/24 1831 07/23/24 1831  Treponema pallidum AB w/Reflex RPR  STAT         07/23/24 1831 07/23/24 0000  Group B Streptococcus Culture - Swab, Vaginal/Rectum        Comments: This is an external result entered through the Results Console.      07/23/24 1918    Unscheduled  Position change  As Needed      Comments: For intra-uterine resuscitation for hypertonus, hypertstimulation, or non-reassuring fetal status    07/23/24 1917    Unscheduled  Up with Assistance  As Needed       07/24/24 0443    Unscheduled  Apply Ice to Perineum  As Needed       07/24/24 0443    Unscheduled  Bladder Assessment  As Needed       07/24/24 0443    Unscheduled  Apply Ice to Perineum  As Needed      Comments: For 20 min q 2 hrs    07/24/24 0642    Unscheduled  Waffle Cushion  As Needed      Comments: For perineal discomfort    07/24/24 0642    Unscheduled  Donut Ring  As Needed      Comments: For perineal pain    07/24/24 0642    Unscheduled  Kpad  As Needed      Comments: For pain    07/24/24 0642    Unscheduled  Warm compress  As Needed       07/24/24 0642    Unscheduled  Apply ace wrap, tight bra, or binder  As Needed       07/24/24 0642    Unscheduled  Apply ice packs  As Needed   "     24 0642    --  Prenatal Vit-Fe Fumarate-FA (Prenatal -) 27-1 MG tablet tablet  Daily         24 191                     Operative/Procedure Notes (last 24 hours)        Yahaira Kerns CNM at 24 0442           [facility]   Vaginal Delivery Note    Patient Name: Ashley Lucia  : 1990  MRN: [id number]    Date of Delivery: 2024     Diagnosis     Pre & Post-Delivery:  Intrauterine pregnancy at 38w5d  Labor status: Induced Onset of Labor     Cholestasis of pregnancy in third trimester    Maternal anemia in pregnancy, antepartum     (normal spontaneous vaginal delivery)             Problem List    Transfer to Postpartum     Review the Delivery Report for details.     Delivery     Delivery: Vaginal, Spontaneous     YOB: 2022    Time of Birth:  Gestational Age 4:24 AM   38w5d     Anesthesia: None     Delivering clinician: Yahaira Kerns    Forceps?   No   Vacuum? No    Shoulder dystocia present: No        Delivery narrative:  Progressed to 8-9 cm on her own after steen balloon and AROM.  She wsa then pushing uncontrollably with cervix all around head.  Contractions were about every 4 minutes.  Pitocin added at the last to help her get to complete  at 38w5d and then she pushed and rapidly delivered a viable female infant over an intact perineum.  Anterior shoulder delivered with ease. Infant vigorous at delivery so placed on maternal abdomen. Delayed cord clamping x 1 min. Cord doubly clamped and cut. Cord blood and cord segment obtained. Placenta delivered spontaneously with trailing membranes and appeared intact. Pitocin to IVF. =      Infant     Findings: female  infant     Infant observations: Weight: 3310 g (7 lb 4.8 oz)   Length: 19.75  in  Observations/Comments:        Apgars: 8  @ 1 minute /    9  @ 5 minutes   Infant Name: undecided     Placenta & Cord         Placenta delivered  Spontaneous  at   2024  4:31 AM     Cord: 3 vessels   present.   Nuchal Cord?  no   Cord blood obtained: Yes    Cord gases obtained:  No    Cord gas results: N/A         Repair     Episiotomy: None     No    Lacerations: No   Estimated Blood Loss:       Quantitative Blood Loss:  100        Complications     none    Disposition     Mother to Mother Baby/Postpartum  in stable condition currently.  Baby to remains with mom  in stable condition currently.    Yahaira Kerns CNM  07/24/24  04:45 EDT          Electronically signed by Yahaira Kerns CNM at 07/24/24 0446       Physician Progress Notes (last 24 hours)  Notes from 07/23/24 0659 through 07/24/24 0659   No notes of this type exist for this encounter.

## 2024-07-24 NOTE — PLAN OF CARE
Goal Outcome Evaluation:  Plan of Care Reviewed With: patient, spouse        Progress: improving  Outcome Evaluation: Vitals WNL. Pt denies pain. She ambulates in room independently. Voids spontaneously. Fundus has been U/U, firm, and midline. Light rubra present. Postpartum follow-up appointment obtained and charted. [place] score of 0.

## 2024-07-24 NOTE — PROGRESS NOTES
Patient talked to her sister and some friends and now wants to have the balloon.  Mcallister balloon placed without difficulty.    SVE 2-3/70/-1.  May be laboring on her own.  Mild contractions noted.  Last baby 9 years ago.      FHR remains category 1.    P:  Expectant management and antibiotics for GBS

## 2024-07-24 NOTE — LACTATION NOTE
07/24/24 0900   Maternal Information   Date of Referral 07/24/24   Person Making Referral lactation consultant  (Courtesy consult)   Maternal Reason for Referral   (Breast-fed first baby for 2-1/2 months and mostly pumped for second baby for 1 month.  Had a good milk supply, but found breast-feeding and pumping too hard with babies so close together.  Mom wants to breast-feed this baby for 1 year.)   Infant Reason for Referral   (Reports breast-feeding has been going well with this baby.)   Milk Expression/Equipment   Breast Pump Type double electric, personal  (Mom has an insurance Medela breast pump at home.  Gave QR code for instructional video.)   Breast Pumping   Breast Pumping Interventions   (Can pump for missed or short breast feedings--if baby not breast-feeding well.)     Teaching documented under education.  Encouraged skin to skin.  Instructed mom to call for lactation services, if there are questions or concerns, or if mom wants help with latch and position.

## 2024-07-24 NOTE — PROGRESS NOTES
"07/24/24  00:22 EDT  Ashley Luica      ASSESSMENTS:  Balloon out and patient requesting her water broke.    /78 (BP Location: Right arm, Patient Position: Sitting)   Pulse 82   Temp 97.8 °F (36.6 °C) (Oral)   Resp 16   Ht 157.5 cm (62\")   Wt 84.8 kg (187 lb)   LMP 07/01/2023 (Exact Date)   SpO2 99%   Breastfeeding Yes   BMI 34.20 kg/m²     Fetal Heart Rate Assessment   Method: Fetal HR Assessment Method: external   Beats/min: Fetal HR (beats/min): 130   Baseline: Fetal HR Baseline: normal range   Varibility: Fetal HR Variability: moderate (amplitude range 6 to 25 bpm)   Accels: Fetal HR Accelerations: greater than/equal to 15 bpm, lasting at least 15 seconds   Decels: Fetal HR Decelerations: absent   Tracing Category:       Uterine Assessment   Method: Method: external tocotransducer   Frequency (min): Contraction Frequency (Minutes): 4-6   Ctx Count in 10 min:     Duration:     Intensity: Contraction Intensity: mild by palpation   Intensity by IUPC:     Resting Tone: Uterine Resting Tone: soft by palpation   Resting Tone by IUPC:     Herron Units:                                Presentation: vertex   Cervix: Exam by: Method: sterile exam per CNM   Dilation: Cervical Dilation (cm): 6   Effacement: Cervical Effacement: 80%   Station: Fetal Station: -1            Lab Results   Component Value Date    WBC 9.85 07/23/2024    HGB 11.4 (L) 07/23/2024    HCT 33.4 (L) 07/23/2024    MCV 91.3 07/23/2024     07/23/2024    CREATININE 0.64 07/19/2024     (H) 07/19/2024     (H) 07/19/2024     Results from last 7 days   Lab Units 07/23/24  1843   ABO TYPING  A   RH TYPING  Positive   ANTIBODY SCREEN  Negative       PLAN: AROM clear fluid.  States contractions are getting stronger.  Continues to rest in between contractions, tolerating them well    Yahaira Kerns CNM  00:22 EDT  07/24/24  "

## 2024-07-25 VITALS
TEMPERATURE: 97.6 F | OXYGEN SATURATION: 99 % | DIASTOLIC BLOOD PRESSURE: 69 MMHG | WEIGHT: 187 LBS | HEART RATE: 66 BPM | BODY MASS INDEX: 34.41 KG/M2 | SYSTOLIC BLOOD PRESSURE: 106 MMHG | HEIGHT: 62 IN | RESPIRATION RATE: 16 BRPM

## 2024-07-25 LAB
BASOPHILS # BLD AUTO: 0.06 10*3/MM3 (ref 0–0.2)
BASOPHILS NFR BLD AUTO: 0.5 % (ref 0–1.5)
DEPRECATED RDW RBC AUTO: 43.3 FL (ref 37–54)
EOSINOPHIL # BLD AUTO: 0.27 10*3/MM3 (ref 0–0.4)
EOSINOPHIL NFR BLD AUTO: 2.1 % (ref 0.3–6.2)
ERYTHROCYTE [DISTWIDTH] IN BLOOD BY AUTOMATED COUNT: 12.9 % (ref 12.3–15.4)
HCT VFR BLD AUTO: 31.6 % (ref 34–46.6)
HGB BLD-MCNC: 10.8 G/DL (ref 12–15.9)
IMM GRANULOCYTES # BLD AUTO: 0.13 10*3/MM3 (ref 0–0.05)
IMM GRANULOCYTES NFR BLD AUTO: 1 % (ref 0–0.5)
LYMPHOCYTES # BLD AUTO: 3 10*3/MM3 (ref 0.7–3.1)
LYMPHOCYTES NFR BLD AUTO: 23.8 % (ref 19.6–45.3)
MCH RBC QN AUTO: 31.6 PG (ref 26.6–33)
MCHC RBC AUTO-ENTMCNC: 34.2 G/DL (ref 31.5–35.7)
MCV RBC AUTO: 92.4 FL (ref 79–97)
MONOCYTES # BLD AUTO: 0.92 10*3/MM3 (ref 0.1–0.9)
MONOCYTES NFR BLD AUTO: 7.3 % (ref 5–12)
NEUTROPHILS NFR BLD AUTO: 65.3 % (ref 42.7–76)
NEUTROPHILS NFR BLD AUTO: 8.23 10*3/MM3 (ref 1.7–7)
NRBC BLD AUTO-RTO: 0 /100 WBC (ref 0–0.2)
PLATELET # BLD AUTO: 246 10*3/MM3 (ref 140–450)
PMV BLD AUTO: 11.8 FL (ref 6–12)
RBC # BLD AUTO: 3.42 10*6/MM3 (ref 3.77–5.28)
WBC NRBC COR # BLD AUTO: 12.61 10*3/MM3 (ref 3.4–10.8)

## 2024-07-25 PROCEDURE — 85025 COMPLETE CBC W/AUTO DIFF WBC: CPT | Performed by: ADVANCED PRACTICE MIDWIFE

## 2024-07-25 RX ORDER — IBUPROFEN 600 MG/1
600 TABLET ORAL EVERY 6 HOURS PRN
Qty: 60 TABLET | Refills: 0 | Status: SHIPPED | OUTPATIENT
Start: 2024-07-25

## 2024-07-25 RX ORDER — PSEUDOEPHEDRINE HCL 30 MG
100 TABLET ORAL 2 TIMES DAILY
Qty: 60 CAPSULE | Refills: 0 | Status: SHIPPED | OUTPATIENT
Start: 2024-07-25

## 2024-07-25 RX ADMIN — DOCUSATE SODIUM 100 MG: 100 CAPSULE, LIQUID FILLED ORAL at 08:04

## 2024-07-25 RX ADMIN — FERROUS SULFATE TAB 325 MG (65 MG ELEMENTAL FE) 325 MG: 325 (65 FE) TAB at 08:04

## 2024-07-25 NOTE — DISCHARGE SUMMARY
[facility]  Vaginal delivery discharge summary      Patient: Ashley Lucia        MR#:[id number]    Admission  Diagnosis:   Cholestasis of pregnancy in third trimester           Discharge Diagnosis:   (normal spontaneous vaginal delivery)      Date of Admission: 2024  Date of Discharge:   24    Procedures:  Vaginal, Spontaneous     2024    4:24 AM      Service:  Obstetrics    Hospital Course:  Patient underwent vaginal delivery and remained in the hospital for 2 days.  During that time she remained afebrile and hemodynamically stable.  On the day of discharge, she was eating, ambulating and voiding without difficulty.      Labs:    Lab Results   Component Value Date    WBC 12.61 (H) 2024    HGB 10.8 (L) 2024    HCT 31.6 (L) 2024    MCV 92.4 2024     2024    CREATININE 0.64 2024     (H) 2024     (H) 2024     Results from last 7 days   Lab Units 24  1843   ABO TYPING  A   RH TYPING  Positive   ANTIBODY SCREEN  Negative       Discharge Medications     Discharge Medications        New Medications        Instructions Start Date   docusate sodium 100 MG capsule   100 mg, Oral, 2 Times Daily      ibuprofen 600 MG tablet  Commonly known as: ADVIL,MOTRIN   600 mg, Oral, Every 6 Hours PRN             Continue These Medications        Instructions Start Date   Prenatal 27-1 27-1 MG tablet tablet   Oral, Daily               Discharge Disposition:  To Home    Discharge Condition:  Stable    Discharge Diet: Regular    Activity at Discharge: Pelvic rest    Follow-up Appointments  Follow up with LW in 4-6 weeks.     Angelica Nagel CNM  24  09:25 EDT

## 2024-07-25 NOTE — PLAN OF CARE
Goal Outcome Evaluation:  Plan of Care Reviewed With: patient        Progress: improving  Outcome Evaluation: Vitals WNL. Pt denies pain. She ambulates in room independently. Fundus has been U/U, firm, and midline. Light rubra present. Pt to D/C home this afternoon.

## 2024-07-25 NOTE — PROGRESS NOTES
7/25/2024  PPD #1    Subjective   Ashley feels well.  Patient describes her lochia as less than menses.  Pain is well controlled  She desires discharge today       Objective   Temp: Temp:  [97.6 °F (36.4 °C)-98.6 °F (37 °C)] 97.6 °F (36.4 °C) Temp src: Oral   BP: BP: (106-129)/(64-80) 106/69        Pulse: Heart Rate:  [66-74] 66  RR: Resp:  [16-18] 16    General:  No acute distress   Abdomen: Fundus firm and beneath umbilicus   Pelvis: deferred     Lab Results   Component Value Date    WBC 12.61 (H) 07/25/2024    HGB 10.8 (L) 07/25/2024    HCT 31.6 (L) 07/25/2024    MCV 92.4 07/25/2024     07/25/2024    ABORH A Rh Positive 01/18/2015    RUBELLAIGGIN Immune 07/07/2014    HEPBSAG Non-Reactive 01/22/2024     (H) 07/19/2024     (H) 07/19/2024       Assessment  PPD# 1 after vaginal delivery    Plan  Discharge to home  Follow up with [facility] in 6 weeks  Prescription for Motrin.  Advised no tampons, intercourse, or tub baths for 6 weeks.       This note has been electronically signed.    Angelica Nagel CNM  08:51 EDT  July 25, 2024

## 2024-07-25 NOTE — LACTATION NOTE
07/25/24 1200   Maternal Information   Date of Referral 07/25/24   Person Making Referral nurse;patient   Maternal Reason for Referral latch difficulty  (Baby not latching.  Mom states she had milk after her babies quit breast-feeding at 2-1/2 and 1 month of age.)   Infant Reason for Referral   (Mom states baby was latching well yesterday but quit last night and has been crying at the breast and refusing to latch.)   Maternal Assessment   Breast Size Issue none   Breast Shape Bilateral:;round;wide   Breast Density Bilateral:;soft   Nipples Bilateral:;graspable   Left Nipple Symptoms intact;nontender   Right Nipple Symptoms intact;nontender   Maternal Infant Feeding   Maternal Emotional State receptive;independent   Infant Positioning   (Changed from cradle to cross cradle hold on left.)   Signs of Milk Transfer   (Baby crying at breast and not willing to latch.  Mom has used formula in the night.  Demonstrated how to use formula and bottle with a small nipple shield, to help baby get to the breast.)   Pain with Feeding no   Comfort Measures Before/During Feeding infant position adjusted;latch adjusted;maternal position adjusted  (Small nipple shield and bottle of formula.)   Latch Assistance minimal assistance   Milk Expression/Equipment   Breast Pump Type double electric, personal  (Mom has a Medela pump at home)   Breast Pump Flange Type hard   Breast Pump Flange Size 21 mm  (Changed to 24 mm flange if nipples get tight on the 21 mm flange)   Breast Pumping   Breast Pumping Interventions post-feed pumping encouraged  (Encouraged to pump after each breast-feeding for 15 to 20 minutes, to make sure mom gets breastmilk supply possible.)     Baby refusing to latch even with nipple shield.  Mom going home today.  Demonstrated how to use some formula to get baby to the breast.  Encouraged to feed baby every 3 hours--breast-feed for 15 to 20 minutes/supplement with 10 to 15 mL or more of expressed breastmilk or  formula/pump for 15 to 20 minutes.  Discussed milk supply, pump use, supplementation, breast care, nipple shield use, how to avoid and treat engorgement, good latch and position, follow-up with pediatrician tomorrow.  Encouraged mom to call lactation services if there are questions or concerns, or if mom wants an outpatient lactation clinic appointment.  Discussed evaluation by speech-language pathologist, if baby continues to have trouble latching.

## 2024-08-05 ENCOUNTER — MATERNAL SCREENING (OUTPATIENT)
Dept: CALL CENTER | Facility: HOSPITAL | Age: 34
End: 2024-08-05
Payer: COMMERCIAL

## 2024-08-05 NOTE — OUTREACH NOTE
Maternal Screening Survey      Flowsheet Row Responses   Facility patient discharged from? [facility]   Attempt successful? No   Unsuccessful attempts Attempt 1              Felicity DIEZ - Registered Nurse